# Patient Record
Sex: FEMALE | Race: WHITE | NOT HISPANIC OR LATINO | URBAN - METROPOLITAN AREA
[De-identification: names, ages, dates, MRNs, and addresses within clinical notes are randomized per-mention and may not be internally consistent; named-entity substitution may affect disease eponyms.]

---

## 2018-07-09 ENCOUNTER — TELEPHONE (OUTPATIENT)
Dept: OBSTETRICS AND GYNECOLOGY | Facility: CLINIC | Age: 63
End: 2018-07-09

## 2018-07-09 NOTE — TELEPHONE ENCOUNTER
Pt called and would like to talk to Dr Quintanilla about her visit with the rheumatologist and the side effects that she had from the medication. Wants to know if she should be taking both the Reqlast and the Evista. I called Dr Doyle's office for office notes and labs. The pt is going to send us her labs as well where she had an elevated CMP that was repeated 2 times and returned to WNL. Will present Dr Quintanilla all of the information and ask him to call her. Pt aware that he is not in the office today.

## 2018-07-11 ENCOUNTER — TELEPHONE (OUTPATIENT)
Dept: OBSTETRICS AND GYNECOLOGY | Facility: CLINIC | Age: 63
End: 2018-07-11

## 2018-07-11 NOTE — TELEPHONE ENCOUNTER
Called patient.  She was given a dose of Reclast by Dr. Doyle.  Following this, she had multiple systemic complaints.  These complaints resolved over approximately 4 weeks.  She questions whether to continue on Evista or not.  I suggested she recontact Dr. Doyle with this question.  If she is not reassured after his answer, she is to call here for a referral for second opinion.  She is comfortable with this plan.  She has no further questions at this time.    NAB

## 2018-10-23 ENCOUNTER — OFFICE VISIT (OUTPATIENT)
Dept: OBSTETRICS AND GYNECOLOGY | Facility: CLINIC | Age: 63
End: 2018-10-23
Payer: COMMERCIAL

## 2018-10-23 VITALS — SYSTOLIC BLOOD PRESSURE: 110 MMHG | DIASTOLIC BLOOD PRESSURE: 65 MMHG | WEIGHT: 142 LBS

## 2018-10-23 DIAGNOSIS — N64.4 MASTALGIA: Primary | ICD-10-CM

## 2018-10-23 PROCEDURE — 99213 OFFICE O/P EST LOW 20 MIN: CPT | Performed by: OBSTETRICS & GYNECOLOGY

## 2018-10-23 RX ORDER — COLCHICINE 0.6 MG/1
1.2 TABLET ORAL DAILY
COMMUNITY

## 2018-10-23 NOTE — PROGRESS NOTES
Patient ID: Luh Graham   : 1955 63 y.o.  MRN: [id number]   Visit Date: 10/23/2018    Subjective   Luh Graham is presenting today for Breast Cancer Screening (breast check)      Patient presents for a problem visit.  Approximately 4-5 days ago the patient noticed some discomfort in her left axillary region.  She believes she felt a palpable change in the area.  Since that time the pain has become less but is still present.  She also believes the palpable change is less but still present.  She had previously seen Dr. Ellis at [facility] for breast evaluation secondary to her mammogram and a family history of breast cancer.            Past Medical History:  has a past medical history of Breast cancer screening (2018); FMF (familial Mediterranean fever) (CMS/HCC) ([facility]); GERD (gastroesophageal reflux disease); H/O bone density study (2018); Osteoporosis; and Pap smear for cervical cancer screening (01/10/2017).     Past Surgical History:  has a past surgical history that includes Appendectomy;  section; Vulva surgery (); and Cervix lesion destruction ().    Obstetric History:   OB History    Para Term  AB Living   1 1 1     2   SAB TAB Ectopic Multiple Live Births         1 2      # Outcome Date GA Lbr Sudhir/2nd Weight Sex Delivery Anes PTL Lv   1A Term     F CS-LTranv   BENY   1B Term     F CS-LTranv   BENY          Medications:   Current Outpatient Prescriptions:   •  colchicine (COLCRYS) 0.6 mg tablet, Take by mouth., Disp: , Rfl:       Allergies: is allergic to penicillins.       Vital Signs for this encounter: /65   Wt 64.4 kg (142 lb)   Breastfeeding? No     Physical Exam   Constitutional: She appears well-developed and well-nourished. No distress.   Pulmonary/Chest:   Breasts are without dominant masses, skin changes, adenopathy or nipple discharge.  The patient identifies an area of concern in the left axilla at what may be the  end of the tail of breast tissue.       Impression/Plan:        Mastalgia    Family history of breast cancer    Reviewed patient's family history, her physical exam and her complaints.  Given that her symptoms seem to be resolving spontaneously, I have suggested that the patient wait for 2 weeks.  If her concerns of not resolved 100% at that time, she is to schedule a follow-up appointment Dr. Ellis for evaluation and treatment.  She is comfortable with this plan.  She will let me know if consultation is required.  If she does not contact the office it is our understanding that her concerns have completely resolved.    NAB

## 2018-12-12 ENCOUNTER — TELEPHONE (OUTPATIENT)
Dept: OBSTETRICS AND GYNECOLOGY | Facility: CLINIC | Age: 63
End: 2018-12-12

## 2018-12-12 NOTE — TELEPHONE ENCOUNTER
"Pt states that she had seen Dr. Quintanilla in October for an abnormality in her breast. It was recommended for her to see Dr. Ellis at [facility]. She did not make an appt to see him, but something is \"bothering her under her arm\". She is wondering if she should have her mammo early, or if she should have different imaging. I suggested she call to see how soon she can get in with Dr. Ellis. If it is not soon, I will ask Dr. Fournier if she would like her to come in or have special imaging. She agreed to this plan and will call back after she schedules with Dr. Ellis.  "

## 2019-02-04 DIAGNOSIS — Z12.39 SCREENING BREAST EXAMINATION: Primary | ICD-10-CM

## 2019-02-05 ENCOUNTER — OFFICE VISIT (OUTPATIENT)
Dept: OBSTETRICS AND GYNECOLOGY | Facility: CLINIC | Age: 64
End: 2019-02-05
Payer: COMMERCIAL

## 2019-02-05 VITALS
WEIGHT: 145.8 LBS | HEIGHT: 60 IN | DIASTOLIC BLOOD PRESSURE: 84 MMHG | SYSTOLIC BLOOD PRESSURE: 124 MMHG | BODY MASS INDEX: 28.62 KG/M2

## 2019-02-05 DIAGNOSIS — Z01.419 ENCOUNTER FOR GYNECOLOGICAL EXAMINATION WITHOUT ABNORMAL FINDING: Primary | ICD-10-CM

## 2019-02-05 PROCEDURE — S0612 ANNUAL GYNECOLOGICAL EXAMINA: HCPCS | Performed by: OBSTETRICS & GYNECOLOGY

## 2019-02-05 RX ORDER — LOSARTAN POTASSIUM 100 MG/1
100 TABLET ORAL DAILY
COMMUNITY
Start: 2019-01-15

## 2019-02-05 NOTE — PROGRESS NOTES
Patient ID: Luh Graham   : 1955 63 y.o.  MRN: [id number]   Visit Date: 2019    Subjective   Luh Graham is presenting today for Annual Exam      Patient presents for an annual GYN exam.  She has no complaints or concerns at this time.  She is undergoing new therapy for her familial Mediterranean fever.  She is seen by both nephrologist and rheumatologist.  Also, she is followed by Dr. Ellis at [facility] for breast issues.            Past Medical History:  has a past medical history of Breast cancer screening (2018); FMF (familial Mediterranean fever) (CMS/HCC) (); GERD (gastroesophageal reflux disease); H/O bone density study (2018); abnormal cervical Pap smear (); Hypertension; Osteoporosis; Osteoporosis; and Pap smear for cervical cancer screening (01/10/2017).     Past Surgical History:  has a past surgical history that includes Appendectomy;  section; Vulva surgery (); and Cervix lesion destruction ().    Obstetric History:   OB History    Para Term  AB Living   1 1 1     2   SAB TAB Ectopic Multiple Live Births         1 2      # Outcome Date GA Lbr Sudhir/2nd Weight Sex Delivery Anes PTL Lv   1A Term     F CS-LTranv   BENY   1B Term     F CS-LTranv   BENY          Family History: family history includes Breast cancer in her niece and sister; Cancer in her mother; Diabetes in her maternal grandmother and mother; Hypertension in her mother; Other cancer in her mother.    Social History:  reports that she has never smoked. She has never used smokeless tobacco. She reports that she drinks about 0.6 oz of alcohol per week . She reports that she does not use drugs.    Medications:   Current Outpatient Prescriptions:   •  anakinra (KINERET) 100 mg/0.67 mL injection, Inject 100 mg under the skin daily., Disp: , Rfl:   •  colchicine (COLCRYS) 0.6 mg tablet, Take by mouth., Disp: , Rfl:   •  losartan (COZAAR) 25 mg tablet, Take by  mouth once daily., Disp: , Rfl:       Allergies: is allergic to penicillins.     Review of Systems  Constitutional:   No hot flashes.   No night sweats.   No unexpected weight changes.  HENT:   No oral ulcers.   No headaches related to menstrual cycle.   Breasts:   No changes to skin of the breast or nipple.   No nipple discharge.   No breast lumps/cysts.   No breast pain.   Patient has not noticed any changes to breasts.   Respiratory:   No shortness of breath.  Cardiovascular:   No chest pain  Gastrointestinal:   No changes in bowel habits.  Genitourinary:   No pain with urination.   No urgency with urination.   No increased frequency of urination.   No urinary incontinence.   No vaginal discharge.   No painful intercourse.   No genital sores.   No irregular menstrual cycles.   No heavy menstrual cycles.   No painful menstrual periods.   No bleeding between menstrual cycles.   No bleeding after intercourse.  No absence of menstrual periods.  Integument:   No changes to any existing genital skin lesions or moles.   No new vaginal skin lesions or moles.   No genital rashes.   Endocrine:   No increased hair growth   Psychiatric:   No anxiety.   No depression.   No suicidal ideation.   Heme-Lymph:   No easy bruising.   No unexplained lumps.          Vital Signs for this encounter: /84 (BP Location: Left upper arm, Patient Position: Sitting)   Ht 1.524 m (5')   Wt 66.1 kg (145 lb 12.8 oz)   BMI 28.47 kg/m²     OBGyn Exam    General Appearance: Alert, cooperative, no acute distress  Head: Normocephalic, without obvious abnormality  Breast: No tenderness, masses, skin changes, adenopathy, or nipple discharge.  Abdomen: Soft, nontender, nondistended, no masses, no organomegaly  Genitalia:   External genitalia: Moderate atrophic changes without rashes or lesions.    Vagina: Moderate atrophic changes, narrow introitus, no blood or discharge seen.    Cervix: Deep, no lesions seen.    Uterus: Mid position, normal  size.    Adnexa: No palpable masses or tenderness.      Extremities: no edema    Impression/Plan:    Normal GYN exam    Mammogram ordered.    Return 1 year or sooner as needed.    NAB

## 2019-06-11 ENCOUNTER — TELEPHONE (OUTPATIENT)
Dept: OBSTETRICS AND GYNECOLOGY | Facility: CLINIC | Age: 64
End: 2019-06-11

## 2019-07-31 DIAGNOSIS — Z12.39 SCREENING BREAST EXAMINATION: ICD-10-CM

## 2019-07-31 PROCEDURE — 200200 BI SCREENING MAMMOGRAM BILATERAL: Mod: 26 | Performed by: OBSTETRICS & GYNECOLOGY

## 2020-02-06 ENCOUNTER — OFFICE VISIT (OUTPATIENT)
Dept: OBSTETRICS AND GYNECOLOGY | Facility: CLINIC | Age: 65
End: 2020-02-06
Payer: COMMERCIAL

## 2020-02-06 VITALS — BODY MASS INDEX: 28.66 KG/M2 | HEIGHT: 60 IN | WEIGHT: 146 LBS

## 2020-02-06 DIAGNOSIS — Z12.31 BREAST CANCER SCREENING BY MAMMOGRAM: ICD-10-CM

## 2020-02-06 DIAGNOSIS — Z01.419 ENCOUNTER FOR GYNECOLOGICAL EXAMINATION WITHOUT ABNORMAL FINDING: Primary | ICD-10-CM

## 2020-02-06 DIAGNOSIS — Z12.4 PAP SMEAR FOR CERVICAL CANCER SCREENING: ICD-10-CM

## 2020-02-06 PROCEDURE — S0612 ANNUAL GYNECOLOGICAL EXAMINA: HCPCS | Performed by: OBSTETRICS & GYNECOLOGY

## 2020-02-06 NOTE — PROGRESS NOTES
Patient ID: Luh Graham   : 1955 64 y.o.  MRN: [id number]   Visit Date: 2020    Subjective   Luh Graham is presenting today for Annual Exam      Patient presents for an annual GYN exam.  She has no complaints at this time.  She is followed by Dr. Ellis at [facility] for her breast health.  She is due for mammogram at this time.  Patient has been diagnosed with osteoporosis and is soon to start therapy.          Past Medical History:  has a past medical history of Breast cancer screening (2019), FMF (familial Mediterranean fever) (CMS/[facility]), GERD (gastroesophageal reflux disease), H/O bone density study (2019), abnormal cervical Pap smear (), Hypertension, Osteoporosis, and Pap smear for cervical cancer screening (01/10/2017).     Past Surgical History:  has a past surgical history that includes Appendectomy;  section; Vulva surgery (); Cervix lesion destruction (); and Colonoscopy ().    Obstetric History:   OB History    Para Term  AB Living   1 1 1     2   SAB TAB Ectopic Multiple Live Births         1 2      # Outcome Date GA Lbr Sudhir/2nd Weight Sex Delivery Anes PTL Lv   1A Term     F CS-LTranv   BENY   1B Term     F CS-LTranv   BENY       Family History: family history includes Breast cancer in her biological sister and niece; Cancer in her biological mother; Diabetes in her biological mother and maternal grandmother; Hypertension in her biological mother; Other cancer in her biological mother.    Social History:  reports that she has never smoked. She has never used smokeless tobacco. She reports that she drinks alcohol. She reports that she does not use drugs.    Medications:   Current Outpatient Medications:   •  canakinumab, PF, (ILARIS, PF,) 150 mg/mL solution, Inject 150 mg under the skin every 28 (twentyeight) days., Disp: , Rfl:   •  colchicine (COLCRYS) 0.6 mg tablet, Take by mouth., Disp: , Rfl:   •  losartan  (COZAAR) 25 mg tablet, Take 50 mg by mouth once daily.  , Disp: , Rfl:       Allergies: is allergic to penicillins.     Review of Systems  Constitutional:   No hot flashes.   No night sweats.   No unexpected weight changes.  HENT:   No oral ulcers.   No headaches related to menstrual cycle.   Breasts:   No changes to skin of the breast or nipple.   No nipple discharge.   No breast lumps/cysts.   No breast pain.   Patient has not noticed any changes to breasts.   Respiratory:   No shortness of breath.  Cardiovascular:   No chest pain  Gastrointestinal:   No changes in bowel habits.  Genitourinary:   No pain with urination.   No urgency with urination.   No increased frequency of urination.   No urinary incontinence.   No vaginal dryness.  No vaginal discharge.   No painful intercourse.   No genital sores.   No irregular menstrual cycles.   No heavy menstrual cycles.   No painful menstrual periods.   No bleeding between menstrual cycles.   No bleeding after intercourse.  No absence of menstrual periods.  Integument:   No changes to any existing genital skin lesions or moles.   No new vaginal skin lesions or moles.   No genital rashes.   Endocrine:   No increased hair growth   Psychiatric:   No anxiety.   No depression.   No suicidal ideation.  Patient does not have concerns for her safety.   Heme-Lymph:   No easy bruising.   No unexplained lumps.           Vital Signs for this encounter:   Visit Vitals  Ht 1.524 m (5')   Wt 66.2 kg (146 lb)   BMI 28.51 kg/m²       OBGyn Exam    General Appearance: Alert, cooperative, no acute distress  Head: Normocephalic, without obvious abnormality  Breast: No tenderness, masses, skin changes, adenopathy, or nipple discharge.  Abdomen: Soft, nontender, nondistended, no masses, no organomegaly  Genitalia:   External genitalia: Moderate atrophic changes without rashes or lesions seen.    Vagina: Moderate atrophic changes, admits one finger, no blood or discharge seen.    Cervix: Deep,  posterior, no lesions seen.    Uterus: Mid position, normal size.    Adnexa: No palpable masses or tenderness.      Extremities: no edema    Impression/Plan:    Normal GYN exam    Pap with HPV testing sent.  Patient to call the office or check the portal in 10 days for results.    Mammogram ordered.    Return 1 year or sooner as needed.    NAB

## 2020-02-12 LAB
CYTOLOGIST CVX/VAG CYTO: NORMAL
CYTOLOGY CVX/VAG DOC CYTO: NORMAL
CYTOLOGY CVX/VAG DOC THIN PREP: NORMAL
DX ICD CODE: NORMAL
HPV I/H RISK 4 DNA CVX QL PROBE+SIG AMP: NEGATIVE
LAB CORP NOTE:: NORMAL
LAB CORP QC REVIEWED BY:: NORMAL
OTHER STN SPEC: NORMAL
STAT OF ADQ CVX/VAG CYTO-IMP: NORMAL

## 2020-02-18 DIAGNOSIS — Z12.31 BREAST CANCER SCREENING BY MAMMOGRAM: ICD-10-CM

## 2020-07-19 ENCOUNTER — APPOINTMENT (EMERGENCY)
Dept: RADIOLOGY | Facility: HOSPITAL | Age: 65
DRG: 552 | End: 2020-07-19
Attending: EMERGENCY MEDICINE
Payer: COMMERCIAL

## 2020-07-19 ENCOUNTER — HOSPITAL ENCOUNTER (INPATIENT)
Facility: HOSPITAL | Age: 65
LOS: 1 days | Discharge: HOME HEALTH | DRG: 552 | End: 2020-07-20
Attending: EMERGENCY MEDICINE | Admitting: SURGERY
Payer: COMMERCIAL

## 2020-07-19 DIAGNOSIS — D25.9 UTERINE LEIOMYOMA, UNSPECIFIED LOCATION: ICD-10-CM

## 2020-07-19 DIAGNOSIS — S32.010A CLOSED COMPRESSION FRACTURE OF BODY OF L1 VERTEBRA (CMS/HCC): ICD-10-CM

## 2020-07-19 DIAGNOSIS — M25.551 PAIN OF BOTH HIP JOINTS: ICD-10-CM

## 2020-07-19 DIAGNOSIS — M54.50 ACUTE MIDLINE LOW BACK PAIN WITHOUT SCIATICA: ICD-10-CM
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
## 2020-07-19 DIAGNOSIS — M25.552 PAIN OF BOTH HIP JOINTS: ICD-10-CM

## 2020-07-19 LAB
ABO + RH BLD: NORMAL
ALBUMIN SERPL-MCNC: 4.3 G/DL (ref 3.4–5)
ALP SERPL-CCNC: 57 IU/L (ref 35–126)
ALT SERPL-CCNC: 25 IU/L (ref 11–54)
AMPHET UR QL SCN: NOT DETECTED
ANION GAP SERPL CALC-SCNC: 9 MEQ/L (ref 3–15)
APTT PPP: 26 SEC (ref 23–35)
AST SERPL-CCNC: 38 IU/L (ref 15–41)
BARBITURATES UR QL SCN: NOT DETECTED
BASOPHILS # BLD: 0.02 K/UL (ref 0.01–0.1)
BASOPHILS NFR BLD: 0.6 %
BENZODIAZ UR QL SCN: NOT DETECTED
BILIRUB DIRECT SERPL-MCNC: 0.1 MG/DL
BILIRUB SERPL-MCNC: 0.9 MG/DL (ref 0.3–1.2)
BILIRUB UR QL STRIP.AUTO: NEGATIVE MG/DL
BLD GP AB SCN SERPL QL: NEGATIVE
BUN SERPL-MCNC: 16 MG/DL (ref 8–20)
CALCIUM SERPL-MCNC: 8.6 MG/DL (ref 8.9–10.3)
CANNABINOIDS UR QL SCN: NOT DETECTED
CHLORIDE SERPL-SCNC: 108 MEQ/L (ref 98–109)
CLARITY UR REFRACT.AUTO: CLEAR
CO2 SERPL-SCNC: 21 MEQ/L (ref 22–32)
COCAINE UR QL SCN: NOT DETECTED
COLOR UR AUTO: YELLOW
CREAT SERPL-MCNC: 0.8 MG/DL (ref 0.6–1.1)
D AG BLD QL: NEGATIVE
DIFFERENTIAL METHOD BLD: ABNORMAL
EOSINOPHIL # BLD: 0.06 K/UL (ref 0.04–0.36)
EOSINOPHIL NFR BLD: 1.7 %
ERYTHROCYTE [DISTWIDTH] IN BLOOD BY AUTOMATED COUNT: 12.4 % (ref 11.7–14.4)
ETHANOL SERPL-MCNC: <5 MG/DL
GFR SERPL CREATININE-BSD FRML MDRD: >60 ML/MIN/1.73M*2
GLUCOSE SERPL-MCNC: 95 MG/DL (ref 70–99)
GLUCOSE UR STRIP.AUTO-MCNC: NEGATIVE MG/DL
HCT VFR BLDCO AUTO: 39.4 % (ref 35–45)
HGB BLD-MCNC: 13 G/DL (ref 11.8–15.7)
HGB UR QL STRIP.AUTO: NEGATIVE
IMM GRANULOCYTES # BLD AUTO: 0.05 K/UL (ref 0–0.08)
IMM GRANULOCYTES NFR BLD AUTO: 1.4 %
INR PPP: 1.1 INR
KETONES UR STRIP.AUTO-MCNC: NEGATIVE MG/DL
LABORATORY COMMENT REPORT: NORMAL
LEUKOCYTE ESTERASE UR QL STRIP.AUTO: NEGATIVE
LYMPHOCYTES # BLD: 1.89 K/UL (ref 1.2–3.5)
LYMPHOCYTES NFR BLD: 52.1 %
MAGNESIUM SERPL-MCNC: 1 MG/DL (ref 1.8–2.5)
MCH RBC QN AUTO: 30.4 PG (ref 28–33.2)
MCHC RBC AUTO-ENTMCNC: 33 G/DL (ref 32.2–35.5)
MCV RBC AUTO: 92.1 FL (ref 83–98)
MONOCYTES # BLD: 0.24 K/UL (ref 0.28–0.8)
MONOCYTES NFR BLD: 6.6 %
NEUTROPHILS # BLD: 1.37 K/UL (ref 1.7–7)
NEUTS SEG NFR BLD: 37.6 %
NITRITE UR QL STRIP.AUTO: NEGATIVE
NRBC BLD-RTO: 0 %
OPIATES UR QL SCN: NOT DETECTED
PCP UR QL SCN: NOT DETECTED
PDW BLD AUTO: 10.6 FL (ref 9.4–12.3)
PH UR STRIP.AUTO: 6.5 [PH]
PLAT MORPH BLD: NORMAL
PLATELET # BLD AUTO: 181 K/UL (ref 150–369)
PLATELET # BLD EST: ABNORMAL 10*3/UL
POTASSIUM SERPL-SCNC: 3.6 MEQ/L (ref 3.6–5.1)
PROT SERPL-MCNC: 7.7 G/DL (ref 6–8.2)
PROT UR QL STRIP.AUTO: NEGATIVE
PROTHROMBIN TIME: 13.7 SEC (ref 12.2–14.5)
RBC # BLD AUTO: 4.28 M/UL (ref 3.93–5.22)
RBC MORPH BLD: NORMAL
SARS-COV-2 RNA RESP QL NAA+PROBE: NEGATIVE
SODIUM SERPL-SCNC: 138 MEQ/L (ref 136–144)
SP GR UR REFRACT.AUTO: 1.03
UROBILINOGEN UR STRIP-ACNC: 0.2 EU/DL
WBC # BLD AUTO: 3.63 K/UL (ref 3.8–10.5)

## 2020-07-19 PROCEDURE — 83735 ASSAY OF MAGNESIUM: CPT | Performed by: PHYSICIAN ASSISTANT

## 2020-07-19 PROCEDURE — 80053 COMPREHEN METABOLIC PANEL: CPT | Performed by: PHYSICIAN ASSISTANT

## 2020-07-19 PROCEDURE — 74177 CT ABD & PELVIS W/CONTRAST: CPT

## 2020-07-19 PROCEDURE — 63600000 HC DRUGS/DETAIL CODE: Performed by: PHYSICIAN ASSISTANT

## 2020-07-19 PROCEDURE — 71045 X-RAY EXAM CHEST 1 VIEW: CPT

## 2020-07-19 PROCEDURE — 80307 DRUG TEST PRSMV CHEM ANLYZR: CPT | Performed by: NURSE PRACTITIONER

## 2020-07-19 PROCEDURE — 96361 HYDRATE IV INFUSION ADD-ON: CPT | Mod: 59

## 2020-07-19 PROCEDURE — 82248 BILIRUBIN DIRECT: CPT | Performed by: PHYSICIAN ASSISTANT

## 2020-07-19 PROCEDURE — 25800000 HC PHARMACY IV SOLUTIONS: Performed by: NURSE PRACTITIONER

## 2020-07-19 PROCEDURE — 63700000 HC SELF-ADMINISTRABLE DRUG: Performed by: NURSE PRACTITIONER

## 2020-07-19 PROCEDURE — 93010 ELECTROCARDIOGRAM REPORT: CPT | Performed by: INTERNAL MEDICINE

## 2020-07-19 PROCEDURE — 96374 THER/PROPH/DIAG INJ IV PUSH: CPT | Mod: 59

## 2020-07-19 PROCEDURE — 99285 EMERGENCY DEPT VISIT HI MDM: CPT | Mod: 25

## 2020-07-19 PROCEDURE — 85025 COMPLETE CBC W/AUTO DIFF WBC: CPT | Performed by: PHYSICIAN ASSISTANT

## 2020-07-19 PROCEDURE — 86850 RBC ANTIBODY SCREEN: CPT

## 2020-07-19 PROCEDURE — G0480 DRUG TEST DEF 1-7 CLASSES: HCPCS | Performed by: NURSE PRACTITIONER

## 2020-07-19 PROCEDURE — 93005 ELECTROCARDIOGRAM TRACING: CPT | Performed by: PHYSICIAN ASSISTANT

## 2020-07-19 PROCEDURE — 85610 PROTHROMBIN TIME: CPT | Performed by: PHYSICIAN ASSISTANT

## 2020-07-19 PROCEDURE — 63600105 HC IODINE BASED CONTRAST: Performed by: PHYSICIAN ASSISTANT

## 2020-07-19 PROCEDURE — 81003 URINALYSIS AUTO W/O SCOPE: CPT | Performed by: NURSE PRACTITIONER

## 2020-07-19 PROCEDURE — U0002 COVID-19 LAB TEST NON-CDC: HCPCS | Performed by: PHYSICIAN ASSISTANT

## 2020-07-19 PROCEDURE — 72148 MRI LUMBAR SPINE W/O DYE: CPT

## 2020-07-19 PROCEDURE — 36415 COLL VENOUS BLD VENIPUNCTURE: CPT | Performed by: PHYSICIAN ASSISTANT

## 2020-07-19 PROCEDURE — 21400000 HC ROOM AND CARE CCU/INTERMEDIATE

## 2020-07-19 PROCEDURE — 85730 THROMBOPLASTIN TIME PARTIAL: CPT | Performed by: PHYSICIAN ASSISTANT

## 2020-07-19 PROCEDURE — 96375 TX/PRO/DX INJ NEW DRUG ADDON: CPT | Mod: 59

## 2020-07-19 RX ORDER — HYDROMORPHONE HYDROCHLORIDE 1 MG/ML
1 INJECTION, SOLUTION INTRAMUSCULAR; INTRAVENOUS; SUBCUTANEOUS ONCE
Status: COMPLETED | OUTPATIENT
Start: 2020-07-19 | End: 2020-07-19

## 2020-07-19 RX ORDER — LOSARTAN POTASSIUM 50 MG/1
50 TABLET ORAL DAILY
Status: DISCONTINUED | OUTPATIENT
Start: 2020-07-20 | End: 2020-07-20

## 2020-07-19 RX ORDER — COLCHICINE 0.6 MG/1
0.6 TABLET ORAL DAILY
Status: DISCONTINUED | OUTPATIENT
Start: 2020-07-20 | End: 2020-07-20

## 2020-07-19 RX ORDER — LORAZEPAM 2 MG/ML
1 INJECTION INTRAMUSCULAR ONCE
Status: COMPLETED | OUTPATIENT
Start: 2020-07-19 | End: 2020-07-19

## 2020-07-19 RX ORDER — OXYCODONE HYDROCHLORIDE 5 MG/1
5 TABLET ORAL EVERY 4 HOURS PRN
Status: DISCONTINUED | OUTPATIENT
Start: 2020-07-19 | End: 2020-07-20 | Stop reason: HOSPADM

## 2020-07-19 RX ORDER — DEXTROSE 50 % IN WATER (D50W) INTRAVENOUS SYRINGE
25 AS NEEDED
Status: DISCONTINUED | OUTPATIENT
Start: 2020-07-19 | End: 2020-07-20 | Stop reason: HOSPADM

## 2020-07-19 RX ORDER — ACETAMINOPHEN 325 MG/1
975 TABLET ORAL EVERY 6 HOURS
Status: DISCONTINUED | OUTPATIENT
Start: 2020-07-19 | End: 2020-07-20 | Stop reason: HOSPADM

## 2020-07-19 RX ORDER — AMOXICILLIN 250 MG
1 CAPSULE ORAL 2 TIMES DAILY
Status: DISCONTINUED | OUTPATIENT
Start: 2020-07-19 | End: 2020-07-20 | Stop reason: HOSPADM

## 2020-07-19 RX ORDER — HEPARIN SODIUM 5000 [USP'U]/ML
5000 INJECTION, SOLUTION INTRAVENOUS; SUBCUTANEOUS EVERY 8 HOURS
Status: DISCONTINUED | OUTPATIENT
Start: 2020-07-19 | End: 2020-07-20 | Stop reason: HOSPADM

## 2020-07-19 RX ORDER — OXYCODONE HYDROCHLORIDE 5 MG/1
10 TABLET ORAL EVERY 4 HOURS PRN
Status: DISCONTINUED | OUTPATIENT
Start: 2020-07-19 | End: 2020-07-20 | Stop reason: HOSPADM

## 2020-07-19 RX ORDER — POLYETHYLENE GLYCOL 3350 17 G/17G
17 POWDER, FOR SOLUTION ORAL DAILY
Status: DISCONTINUED | OUTPATIENT
Start: 2020-07-20 | End: 2020-07-20 | Stop reason: HOSPADM

## 2020-07-19 RX ADMIN — ACETAMINOPHEN 975 MG: 325 TABLET, FILM COATED ORAL at 23:36

## 2020-07-19 RX ADMIN — SODIUM CHLORIDE 1000 ML: 9 INJECTION, SOLUTION INTRAVENOUS at 17:40

## 2020-07-19 RX ADMIN — IOHEXOL 100 ML: 350 INJECTION, SOLUTION INTRAVENOUS at 13:10

## 2020-07-19 RX ADMIN — LORAZEPAM 1 MG: 2 INJECTION INTRAMUSCULAR; INTRAVENOUS at 15:38

## 2020-07-19 RX ADMIN — SODIUM CHLORIDE 1000 ML: 9 INJECTION, SOLUTION INTRAVENOUS at 19:05

## 2020-07-19 RX ADMIN — HYDROMORPHONE HYDROCHLORIDE 1 MG: 1 INJECTION, SOLUTION INTRAMUSCULAR; INTRAVENOUS; SUBCUTANEOUS at 12:51

## 2020-07-19 RX ADMIN — ACETAMINOPHEN 975 MG: 325 TABLET, FILM COATED ORAL at 18:01

## 2020-07-19 ASSESSMENT — ENCOUNTER SYMPTOMS
LIGHT-HEADEDNESS: 0
COLOR CHANGE: 0
VOMITING: 0
NAUSEA: 0
NUMBNESS: 0
ABDOMINAL PAIN: 0
HEADACHES: 0
NECK PAIN: 0
SPEECH DIFFICULTY: 0
SHORTNESS OF BREATH: 0
DIARRHEA: 0
FATIGUE: 0
AGITATION: 0
DIFFICULTY URINATING: 0
FACIAL SWELLING: 0
ARTHRALGIAS: 1
COUGH: 0
PHOTOPHOBIA: 0
WOUND: 0
WEAKNESS: 0
BRUISES/BLEEDS EASILY: 0
FACIAL ASYMMETRY: 0
BACK PAIN: 1
DIZZINESS: 0

## 2020-07-19 ASSESSMENT — COGNITIVE AND FUNCTIONAL STATUS - GENERAL
STANDING UP FROM CHAIR USING ARMS: 2 - A LOT
TOILETING: 3 - A LITTLE
CLIMB 3 TO 5 STEPS WITH RAILING: 2 - A LOT
HELP NEEDED FOR BATHING: 3 - A LITTLE
MOVING TO AND FROM BED TO CHAIR: 2 - A LOT
HELP NEEDED FOR PERSONAL GROOMING: 3 - A LITTLE
EATING MEALS: 4 - NONE
DRESSING REGULAR UPPER BODY CLOTHING: 3 - A LITTLE
DRESSING REGULAR LOWER BODY CLOTHING: 3 - A LITTLE
WALKING IN HOSPITAL ROOM: 2 - A LOT
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
## 2020-07-19 NOTE — PROGRESS NOTES
CONSULTS     Time Notified:  1537 Time Patient Seen: 1550     Consultant Emergent  Urgent or   Routine Time Paged Time Responded   [facility] routine  1526 by ED               *emergent requires <30 minutes response on site; urgent requires <12 hours response on site.       Per [facility], non-op mgmt; LSO brace.
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
## 2020-07-20 ENCOUNTER — APPOINTMENT (INPATIENT)
Dept: RADIOLOGY | Facility: HOSPITAL | Age: 65
DRG: 552 | End: 2020-07-20
Attending: PHYSICIAN ASSISTANT
Payer: COMMERCIAL

## 2020-07-20 VITALS
OXYGEN SATURATION: 98 % | WEIGHT: 146.16 LBS | DIASTOLIC BLOOD PRESSURE: 85 MMHG | BODY MASS INDEX: 29.47 KG/M2 | TEMPERATURE: 97.4 F | HEIGHT: 59 IN | HEART RATE: 71 BPM | SYSTOLIC BLOOD PRESSURE: 158 MMHG | RESPIRATION RATE: 18 BRPM

## 2020-07-20 LAB
ANION GAP SERPL CALC-SCNC: 9 MEQ/L (ref 3–15)
BUN SERPL-MCNC: 14 MG/DL (ref 8–20)
CALCIUM SERPL-MCNC: 8 MG/DL (ref 8.9–10.3)
CHLORIDE SERPL-SCNC: 108 MEQ/L (ref 98–109)
CO2 SERPL-SCNC: 22 MEQ/L (ref 22–32)
CREAT SERPL-MCNC: <0.3 MG/DL (ref 0.6–1.1)
ERYTHROCYTE [DISTWIDTH] IN BLOOD BY AUTOMATED COUNT: 12.5 % (ref 11.7–14.4)
GFR SERPL CREATININE-BSD FRML MDRD: ABNORMAL ML/MIN/{1.73_M2}
GLUCOSE SERPL-MCNC: 91 MG/DL (ref 70–99)
HCT VFR BLDCO AUTO: 38 % (ref 35–45)
HGB BLD-MCNC: 12.6 G/DL (ref 11.8–15.7)
MAGNESIUM SERPL-MCNC: 3.2 MG/DL (ref 1.8–2.5)
MCH RBC QN AUTO: 30.5 PG (ref 28–33.2)
MCHC RBC AUTO-ENTMCNC: 33.2 G/DL (ref 32.2–35.5)
MCV RBC AUTO: 92 FL (ref 83–98)
PDW BLD AUTO: 10.4 FL (ref 9.4–12.3)
PHOSPHATE SERPL-MCNC: 2.2 MG/DL (ref 2.4–4.7)
PLATELET # BLD AUTO: 166 K/UL (ref 150–369)
POTASSIUM SERPL-SCNC: 3.1 MEQ/L (ref 3.6–5.1)
RBC # BLD AUTO: 4.13 M/UL (ref 3.93–5.22)
SODIUM SERPL-SCNC: 139 MEQ/L (ref 136–144)
WBC # BLD AUTO: 4.99 K/UL (ref 3.8–10.5)

## 2020-07-20 PROCEDURE — 97162 PT EVAL MOD COMPLEX 30 MIN: CPT | Mod: GP

## 2020-07-20 PROCEDURE — 25800000 HC PHARMACY IV SOLUTIONS: Performed by: PHYSICIAN ASSISTANT

## 2020-07-20 PROCEDURE — 80048 BASIC METABOLIC PNL TOTAL CA: CPT | Performed by: NURSE PRACTITIONER

## 2020-07-20 PROCEDURE — 85027 COMPLETE CBC AUTOMATED: CPT | Performed by: NURSE PRACTITIONER

## 2020-07-20 PROCEDURE — 83735 ASSAY OF MAGNESIUM: CPT | Performed by: NURSE PRACTITIONER

## 2020-07-20 PROCEDURE — 97530 THERAPEUTIC ACTIVITIES: CPT | Mod: GO

## 2020-07-20 PROCEDURE — 63600000 HC DRUGS/DETAIL CODE: Performed by: PHYSICIAN ASSISTANT

## 2020-07-20 PROCEDURE — 97166 OT EVAL MOD COMPLEX 45 MIN: CPT | Mod: GO

## 2020-07-20 PROCEDURE — 63600000 HC DRUGS/DETAIL CODE: Performed by: NURSE PRACTITIONER

## 2020-07-20 PROCEDURE — 63700000 HC SELF-ADMINISTRABLE DRUG: Performed by: NURSE PRACTITIONER

## 2020-07-20 PROCEDURE — 63700000 HC SELF-ADMINISTRABLE DRUG: Performed by: PHYSICIAN ASSISTANT

## 2020-07-20 PROCEDURE — 36415 COLL VENOUS BLD VENIPUNCTURE: CPT | Performed by: NURSE PRACTITIONER

## 2020-07-20 PROCEDURE — 84100 ASSAY OF PHOSPHORUS: CPT | Performed by: NURSE PRACTITIONER

## 2020-07-20 PROCEDURE — 73020 X-RAY EXAM OF SHOULDER: CPT | Mod: LT

## 2020-07-20 PROCEDURE — 99221 1ST HOSP IP/OBS SF/LOW 40: CPT | Performed by: NEUROLOGICAL SURGERY

## 2020-07-20 RX ORDER — ACETAMINOPHEN 325 MG/1
975 TABLET ORAL EVERY 6 HOURS PRN
COMMUNITY
Start: 2020-07-20 | End: 2020-08-03

## 2020-07-20 RX ORDER — LIDOCAINE 560 MG/1
1 PATCH PERCUTANEOUS; TOPICAL; TRANSDERMAL DAILY
Status: DISCONTINUED | OUTPATIENT
Start: 2020-07-20 | End: 2020-07-20 | Stop reason: HOSPADM

## 2020-07-20 RX ORDER — LOSARTAN POTASSIUM 50 MG/1
50 TABLET ORAL ONCE
Status: COMPLETED | OUTPATIENT
Start: 2020-07-20 | End: 2020-07-20

## 2020-07-20 RX ORDER — COLCHICINE 0.6 MG/1
1.2 TABLET ORAL DAILY
Status: DISCONTINUED | OUTPATIENT
Start: 2020-07-21 | End: 2020-07-20 | Stop reason: HOSPADM

## 2020-07-20 RX ORDER — COLCHICINE 0.6 MG/1
0.6 TABLET ORAL ONCE
Status: COMPLETED | OUTPATIENT
Start: 2020-07-20 | End: 2020-07-20

## 2020-07-20 RX ORDER — AMOXICILLIN 250 MG
1 CAPSULE ORAL 2 TIMES DAILY
Qty: 28 TABLET | Refills: 0 | COMMUNITY
Start: 2020-07-20 | End: 2021-02-23 | Stop reason: ALTCHOICE

## 2020-07-20 RX ORDER — LOSARTAN POTASSIUM 100 MG/1
100 TABLET ORAL DAILY
Status: DISCONTINUED | OUTPATIENT
Start: 2020-07-21 | End: 2020-07-20 | Stop reason: HOSPADM

## 2020-07-20 RX ORDER — LIDOCAINE 560 MG/1
1 PATCH PERCUTANEOUS; TOPICAL; TRANSDERMAL DAILY PRN
COMMUNITY
Start: 2020-07-20 | End: 2021-02-23 | Stop reason: ALTCHOICE

## 2020-07-20 RX ORDER — POTASSIUM CHLORIDE 750 MG/1
40 TABLET, FILM COATED, EXTENDED RELEASE ORAL ONCE
Status: COMPLETED | OUTPATIENT
Start: 2020-07-20 | End: 2020-07-20

## 2020-07-20 RX ADMIN — LOSARTAN POTASSIUM 50 MG: 50 TABLET, FILM COATED ORAL at 18:18

## 2020-07-20 RX ADMIN — COLCHICINE 0.6 MG: 0.6 TABLET, FILM COATED ORAL at 08:55

## 2020-07-20 RX ADMIN — SODIUM CHLORIDE 40 MEQ: 9 INJECTION, SOLUTION INTRAVENOUS at 14:56

## 2020-07-20 RX ADMIN — POTASSIUM CHLORIDE 40 MEQ: 750 TABLET, FILM COATED, EXTENDED RELEASE ORAL at 15:35

## 2020-07-20 RX ADMIN — LOSARTAN POTASSIUM 50 MG: 50 TABLET, FILM COATED ORAL at 09:09

## 2020-07-20 RX ADMIN — COLCHICINE 0.6 MG: 0.6 TABLET, FILM COATED ORAL at 18:18

## 2020-07-20 RX ADMIN — HEPARIN SODIUM 5000 UNITS: 5000 INJECTION, SOLUTION INTRAVENOUS; SUBCUTANEOUS at 14:53

## 2020-07-20 RX ADMIN — ACETAMINOPHEN 975 MG: 325 TABLET, FILM COATED ORAL at 11:29

## 2020-07-20 RX ADMIN — LIDOCAINE 1 PATCH: 246 PATCH TOPICAL at 18:25

## 2020-07-20 RX ADMIN — ACETAMINOPHEN 975 MG: 325 TABLET, FILM COATED ORAL at 18:17

## 2020-07-20 RX ADMIN — MAGNESIUM SULFATE IN WATER 2 G: 40 INJECTION, SOLUTION INTRAVENOUS at 01:58

## 2020-07-20 ASSESSMENT — COGNITIVE AND FUNCTIONAL STATUS - GENERAL
HELP NEEDED FOR BATHING: 3 - A LITTLE
CLIMB 3 TO 5 STEPS WITH RAILING: 3 - A LITTLE
TOILETING: 4 - NONE
WALKING IN HOSPITAL ROOM: 3 - A LITTLE
STANDING UP FROM CHAIR USING ARMS: 3 - A LITTLE
HELP NEEDED FOR PERSONAL GROOMING: 4 - NONE
DRESSING REGULAR UPPER BODY CLOTHING: 3 - A LITTLE
EATING MEALS: 4 - NONE
AFFECT: WFL
MOVING TO AND FROM BED TO CHAIR: 3 - A LITTLE
DRESSING REGULAR LOWER BODY CLOTHING: 3 - A LITTLE
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
## 2020-07-20 NOTE — PLAN OF CARE
Problem: Adult Inpatient Plan of Care  Goal: Plan of Care Review  Outcome: Progressing  Flowsheets (Taken 7/20/2020 8304)  Progress: improving  Plan of Care Reviewed With: patient  Outcome Summary: PT eval completed: pt is limited 2* pain, however dem amelioration of symptoms with LSO brace. Pt dem min A-sup w/func mob.  D/w pt spinal prec.  Issued jr RW.  Goal: Patient-Specific Goal (Individualization)  Outcome: Progressing
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
## 2020-07-20 NOTE — NURSING NOTE
IV ML discontinued. Discharge instructions reviewed with patient. Patient expressed understanding. Patient discharged home. Tiffany Du RN

## 2020-07-20 NOTE — PLAN OF CARE
Problem: Adult Inpatient Plan of Care  Goal: Plan of Care Review  Flowsheets (Taken 7/20/2020 7156)  Progress: improving  Plan of Care Reviewed With: patient  Outcome Summary: Patient demonstrates a decline in independence with ADLs and functional transfers 2/2 weakness, decreased activity tolerance, and pain. Min A all bed mobility, Close sup for functional transfers. Patient also supervision level for ADLs after education on adapted technique. Demonstrates good understanding of spinal precautions. Will continue to follow in order to maximize independence with ADLs and functional transfers.

## 2020-07-20 NOTE — PROGRESS NOTES
Patient: Luh Graham  Location: [facility] Surgical Step Down 0361  MRN: [id number]  Today's date: 7/20/2020    Attempted to see patient for therapy. Unable due to medical hold.   Awaiting back brace: will cont to follow.
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
## 2020-07-20 NOTE — PROGRESS NOTES
Spoke with patient and confirmed with medication list from [facility] and/or patient's own pharmacy to complete the medication reconciliation.     Prior to admission medication list:  •  canakinumab (PF), Inject 150 mg under the skin every 28 (twentyeight) days.  •  colchicine, Take 1.2 mg by mouth daily.    •  losartan, Take 100 mg by mouth once daily.      Comments about home medications:  -Patient states her last Ilaris injection was 7/16/2020    Compliance:   -Per pharmacy, patient was filling losartan 50 mg. Patient has not yet filled 100 mg tablet but they do have script on file. Patient states she is taking 100 mg.    -Unable to verify any fills for colchicine but patient states she takes 1.2 mg daily.
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
## 2020-07-20 NOTE — PLAN OF CARE
Problem: Adult Inpatient Plan of Care  Goal: Plan of Care Review  Outcome: Progressing  Flowsheets (Taken 7/20/2020 0400)  Progress: improving  Plan of Care Reviewed With: patient  Goal: Patient-Specific Goal (Individualization)  Outcome: Progressing  Goal: Absence of Hospital-Acquired Illness or Injury  Outcome: Progressing  Goal: Optimal Comfort and Wellbeing  Outcome: Progressing  Goal: Readiness for Transition of Care  Outcome: Progressing     Problem: Skin Injury Risk Increased  Goal: Skin Health and Integrity  Outcome: Progressing     Problem: Pain Acute  Goal: Optimal Pain Control  Outcome: Progressing     Problem: Hypertension Comorbidity  Goal: Blood Pressure in Desired Range  Outcome: Progressing
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
## 2020-07-21 ENCOUNTER — TELEPHONE (OUTPATIENT)
Dept: OBSTETRICS AND GYNECOLOGY | Facility: CLINIC | Age: 65
End: 2020-07-21

## 2020-07-21 DIAGNOSIS — D21.9 FIBROIDS: Primary | ICD-10-CM

## 2020-07-21 LAB
ATRIAL RATE: 68
P AXIS: 38
PR INTERVAL: 150
QRS DURATION: 96
QT INTERVAL: 464
QTC CALCULATION(BAZETT): 493
R AXIS: 11
T WAVE AXIS: 47
VENTRICULAR RATE: 68
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
## 2021-02-23 ENCOUNTER — OFFICE VISIT (OUTPATIENT)
Dept: OBSTETRICS AND GYNECOLOGY | Facility: CLINIC | Age: 66
End: 2021-02-23
Payer: COMMERCIAL

## 2021-02-23 VITALS
WEIGHT: 136.5 LBS | SYSTOLIC BLOOD PRESSURE: 122 MMHG | BODY MASS INDEX: 27.52 KG/M2 | HEIGHT: 59 IN | DIASTOLIC BLOOD PRESSURE: 80 MMHG

## 2021-02-23 DIAGNOSIS — Z01.411 ENCOUNTER FOR GYNECOLOGICAL EXAMINATION WITH ABNORMAL FINDING: Primary | ICD-10-CM

## 2021-02-23 DIAGNOSIS — D21.9 FIBROIDS: ICD-10-CM

## 2021-02-23 PROBLEM — M04.1 FAMILIAL MEDITERRANEAN FEVER (CMS/HCC): Status: ACTIVE | Noted: 2019-03-04

## 2021-02-23 PROBLEM — R80.9 PROTEINURIA: Status: ACTIVE | Noted: 2019-03-04

## 2021-02-23 PROBLEM — M81.0 AGE RELATED OSTEOPOROSIS: Status: ACTIVE | Noted: 2021-02-23

## 2021-02-23 PROBLEM — M19.049 LOCALIZED, PRIMARY OSTEOARTHRITIS OF HAND: Status: ACTIVE | Noted: 2021-02-23

## 2021-02-23 PROBLEM — M51.9 UNSPECIFIED THORACIC, THORACOLUMBAR AND LUMBOSACRAL INTERVERTEBRAL DISC DISORDER: Status: ACTIVE | Noted: 2021-02-23

## 2021-02-23 PROBLEM — I10 ESSENTIAL HYPERTENSION: Status: ACTIVE | Noted: 2019-03-04

## 2021-02-23 PROCEDURE — S0612 ANNUAL GYNECOLOGICAL EXAMINA: HCPCS | Performed by: OBSTETRICS & GYNECOLOGY

## 2021-02-23 RX ORDER — CHLORTHALIDONE 25 MG/1
12.5 TABLET ORAL
COMMUNITY
Start: 2021-01-02
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
## 2021-03-01 DIAGNOSIS — D21.9 FIBROIDS: ICD-10-CM

## 2021-04-14 DIAGNOSIS — Z23 ENCOUNTER FOR IMMUNIZATION: ICD-10-CM

## 2021-12-01 DIAGNOSIS — Z12.31 BREAST CANCER SCREENING BY MAMMOGRAM: Primary | ICD-10-CM

## 2021-12-01 NOTE — PROGRESS NOTES
Pt called for a mammo slip to be mailed.  Last seen 2/2021.  Per Dr. Quintanilla mammo ordered in epic and mailed.    MMB

## 2022-03-01 ENCOUNTER — OFFICE VISIT (OUTPATIENT)
Dept: OBSTETRICS AND GYNECOLOGY | Facility: CLINIC | Age: 67
End: 2022-03-01
Payer: COMMERCIAL

## 2022-03-01 VITALS
WEIGHT: 137.6 LBS | SYSTOLIC BLOOD PRESSURE: 120 MMHG | DIASTOLIC BLOOD PRESSURE: 80 MMHG | HEIGHT: 60 IN | BODY MASS INDEX: 27.01 KG/M2

## 2022-03-01 DIAGNOSIS — Z01.419 ENCOUNTER FOR GYNECOLOGICAL EXAMINATION WITHOUT ABNORMAL FINDING: ICD-10-CM

## 2022-03-01 DIAGNOSIS — R92.8 ABNORMALITY OF LEFT BREAST ON SCREENING MAMMOGRAM: Primary | ICD-10-CM

## 2022-03-01 PROCEDURE — S0612 ANNUAL GYNECOLOGICAL EXAMINA: HCPCS | Performed by: OBSTETRICS & GYNECOLOGY

## 2022-03-01 NOTE — PROGRESS NOTES
Patient ID: Luh Graham   : 1955 66 y.o.  MRN: [id number]   Visit Date: 3/1/2022    Subjective   Luh Graham is presenting today for Annual GYN Exam      Patient presents for an annual GYN exam.  She has no GYN complaints at this time.  She had a mammogram done yesterday and was notified that she needs additional films of the left breast.  She has seen Dr. Ellis (breast surgery) in the past.  She will follow up with him if there is a persistent abnormality.  She had a DEXA scan done also.  She sees Dr. Doyle who is treating her with Prolia.  She will follow up with him about this test.          Past Medical History:  has a past medical history of Breast cancer screening (2022), Fibroid, FMF (familial Mediterranean fever) (CMS/HCC), GERD (gastroesophageal reflux disease), H/O bone density study (2020), abnormal cervical Pap smear (), Hypertension, Osteoporosis, and Pap smear for cervical cancer screening (2020).     Past Surgical History:  has a past surgical history that includes Appendectomy;  section; Vulva surgery (); Cervix lesion destruction (); and Colonoscopy ().    Obstetric History:   OB History    Para Term  AB Living   1 1 1     2   SAB IAB Ectopic Multiple Live Births         1 2      # Outcome Date GA Lbr Sudhir/2nd Weight Sex Delivery Anes PTL Lv   1A Term     F CS-LTranv   BENY   1B Term     F CS-LTranv   BENY       Family History: family history includes Breast cancer in her biological sister and niece; Cancer in her biological mother; Diabetes in her biological mother and maternal grandmother; Hypertension in her biological mother; Other cancer in her biological mother.    Social History:  reports that she has never smoked. She has never used smokeless tobacco. She reports current alcohol use. She reports that she does not use drugs.    Medications:   Current Outpatient Medications:   •  canakinumab, PF, (ILARIS, PF,)  150 mg/mL solution, Inject 150 mg under the skin every 28 (twentyeight) days., Disp: , Rfl:   •  chlorthalidone (HYGROTEN) 25 mg tablet, Take 12.5 mg by mouth once daily., Disp: , Rfl:   •  colchicine (COLCRYS) 0.6 mg tablet, Take 1.2 mg by mouth daily.  , Disp: , Rfl:   •  denosumab (PROLIA) 60 mg/mL syringe, Inject 60 mg under the skin., Disp: , Rfl:   •  losartan (COZAAR) 100 mg tablet, Take 100 mg by mouth once daily.  , Disp: , Rfl:       Allergies: is allergic to penicillins, zoledronic acid, and zoledronic acid-mannitol-water.     Review of Systems  Constitutional:   No hot flashes.   No night sweats.   No unexpected weight changes.  HENT:   No oral ulcers.   No headaches related to menstrual cycle.   Breasts:   No changes to skin of the breast or nipple.   No nipple discharge.   No breast lumps/cysts.   No breast pain.   Patient has not noticed any changes to breasts.   Respiratory:   No shortness of breath.  Cardiovascular:   No chest pain  Gastrointestinal:   No changes in bowel habits.  Genitourinary:   No pain with urination.   No urgency with urination.   No increased frequency of urination.   No urinary incontinence.   No vaginal dryness.  No vaginal discharge.   No painful intercourse.   No genital sores.   No irregular menstrual cycles.   No heavy menstrual cycles.   No painful menstrual periods.   No bleeding between menstrual cycles.   No bleeding after intercourse.  No absence of menstrual periods.  Integument:   No changes to any existing genital skin lesions or moles.   No new vaginal skin lesions or moles.   No genital rashes.   Endocrine:   No increased hair growth   Psychiatric:   No anxiety.   No depression.   No suicidal ideation.  Patient does not have concerns for her safety.   Heme-Lymph:   No easy bruising.   No unexplained lumps.           Vital Signs for this encounter:   Visit Vitals  /80   Ht 1.524 m (5')   Wt 62.4 kg (137 lb 9.6 oz)   BMI 26.87 kg/m²       OBGyn  Exam    General Appearance: Alert, cooperative, no acute distress  Head: Normocephalic, without obvious abnormality  Breast: Fibrocystic changes are noted in both upper outer quadrants.  No tenderness, masses, skin changes, adenopathy, or nipple discharge.  Abdomen: Soft, nontender, nondistended, no masses, no organomegaly  Genitalia:   External genitalia: Moderate atrophic changes without rashes or lesions seen.     Vagina: Moderate atrophic changes, admits one finger, no blood or discharge seen.     Cervix: Deep, posterior, no lesions seen.     Uterus: Mid position, normal size.     Adnexa: No palpable masses or tenderness.    Extremities: no edema    Impression/Plan:    Normal GYN exam    Left mammogram ordered.    Patient to follow-up as noted above.    Return here in 1 year or sooner as needed.    NAB

## 2022-10-11 ENCOUNTER — TELEPHONE (OUTPATIENT)
Dept: OBSTETRICS AND GYNECOLOGY | Facility: CLINIC | Age: 67
End: 2022-10-11
Payer: COMMERCIAL

## 2022-10-11 DIAGNOSIS — R92.8 ABNORMALITY OF LEFT BREAST ON SCREENING MAMMOGRAM: Primary | ICD-10-CM

## 2022-10-11 DIAGNOSIS — Z12.31 BREAST CANCER SCREENING BY MAMMOGRAM: ICD-10-CM

## 2022-10-11 NOTE — TELEPHONE ENCOUNTER
Last Mammo: approximate date 3/2022 and was normal  Last OV: 3/1/2022  Next OV: 3/7/2023    Mammo ordered per pt request.  Yumiko Duggan RN

## 2023-03-02 ENCOUNTER — TELEPHONE (OUTPATIENT)
Dept: OBSTETRICS AND GYNECOLOGY | Facility: CLINIC | Age: 68
End: 2023-03-02

## 2023-03-02 DIAGNOSIS — Z12.31 SCREENING MAMMOGRAM FOR BREAST CANCER: Primary | ICD-10-CM

## 2023-03-02 NOTE — PROGRESS NOTES
Pt called for a mammo slip.  Per Dr. Quintanilla mammo ordered in [facility].    MMB

## 2023-07-21 ENCOUNTER — OFFICE VISIT (OUTPATIENT)
Dept: OBSTETRICS AND GYNECOLOGY | Facility: CLINIC | Age: 68
End: 2023-07-21
Payer: MEDICARE

## 2023-07-21 VITALS
HEIGHT: 60 IN | SYSTOLIC BLOOD PRESSURE: 116 MMHG | WEIGHT: 148 LBS | DIASTOLIC BLOOD PRESSURE: 70 MMHG | BODY MASS INDEX: 29.06 KG/M2

## 2023-07-21 DIAGNOSIS — Z01.419 ENCOUNTER FOR GYNECOLOGICAL EXAMINATION WITHOUT ABNORMAL FINDING: Primary | ICD-10-CM

## 2023-07-21 DIAGNOSIS — Z12.31 BREAST CANCER SCREENING BY MAMMOGRAM: ICD-10-CM

## 2023-07-21 PROCEDURE — G0101 CA SCREEN;PELVIC/BREAST EXAM: HCPCS | Mod: GA | Performed by: OBSTETRICS & GYNECOLOGY

## 2023-07-21 RX ORDER — OMEPRAZOLE 10 MG/1
10 CAPSULE, DELAYED RELEASE ORAL
COMMUNITY

## 2023-07-21 NOTE — PROGRESS NOTES
Patient ID: Luh Graham   : 1955 68 y.o.  MRN: [id number]   Visit Date: 2023    Subjective   Luh Graham is presenting today for Annual GYN Exam      Patient presents for an annual GYN exam.  Infrequently, patient notes episodes of stress incontinence.  She does not feel treatment is indicated at this time.  She is being worked up for an unclear liver problem at this time.          Past Medical History:  has a past medical history of Breast cancer screening (2023), Fibroid, FMF (familial Mediterranean fever) (CMS/HCC), GERD (gastroesophageal reflux disease), H/O bone density study (2020), abnormal cervical Pap smear (), Hypertension, Osteoporosis, and Pap smear for cervical cancer screening (2020).     Past Surgical History:  has a past surgical history that includes Appendectomy;  section; Vulva surgery (); Cervix lesion destruction (); Colonoscopy (); and Cataract extraction (Bilateral, ).    Obstetric History:   OB History    Para Term  AB Living   1 1 1     2   SAB IAB Ectopic Multiple Live Births         1 2      # Outcome Date GA Lbr Sudhir/2nd Weight Sex Delivery Anes PTL Lv   1A Term     F CS-LTranv   BENY   1B Term     F CS-LTranv   BENY       Family History: family history includes Breast cancer in her biological sister and niece; Cancer in her biological mother; Diabetes in her biological mother and maternal grandmother; Hypertension in her biological mother; Other cancer in her biological mother.    Social History:   Social History     Tobacco Use   • Smoking status: Never   • Smokeless tobacco: Never   Substance Use Topics   • Alcohol use: Not Currently     Comment: rare   • Drug use: No       Medications:   Current Outpatient Medications:   •  canakinumab, PF, (ILARIS, PF,) 150 mg/mL solution, Inject 150 mg under the skin every 28 (twentyeight) days., Disp: , Rfl:   •  chlorthalidone (HYGROTEN) 25 mg tablet, Take 12.5  "mg by mouth once daily. Every other day, Disp: , Rfl:   •  colchicine (COLCRYS) 0.6 mg tablet, Take 1.2 mg by mouth daily.  , Disp: , Rfl:   •  denosumab (PROLIA) 60 mg/mL syringe, Inject 60 mg under the skin. Every 6 month, Disp: , Rfl:   •  losartan (COZAAR) 100 mg tablet, Take 100 mg by mouth once daily.  , Disp: , Rfl:   •  omeprazole (PriLOSEC) 20 mg capsule, Take 20 mg by mouth daily before breakfast., Disp: , Rfl:       Allergies: is allergic to penicillins, zoledronic acid, and zoledronic acid-mannitol-water.     Review of Systems  Constitutional:   No hot flashes.   No night sweats.   No unexpected weight changes.  HENT:   No oral ulcers.   No headaches related to menstrual cycle.   Breasts:   No changes to skin of the breast or nipple.   No nipple discharge.   No breast lumps/cysts.   No breast pain.   Patient has not noticed any changes to breasts.   Respiratory:   No shortness of breath.  Cardiovascular:   No chest pain  Gastrointestinal:   No changes in bowel habits.  Genitourinary:   No pain with urination.   No urgency with urination.   No increased frequency of urination.   + urinary incontinence.   No vaginal dryness.  No vaginal discharge.   No painful intercourse.   No genital sores.   No irregular menstrual cycles.   No heavy menstrual cycles.   No painful menstrual periods.   No bleeding between menstrual cycles.   No bleeding after intercourse.  No absence of menstrual periods.  Integument:   No changes to any existing genital skin lesions or moles.   No new vaginal skin lesions or moles.   No genital rashes.   Endocrine:   No increased hair growth   Psychiatric:   No anxiety.   No depression.   No suicidal ideation.  Patient does not have concerns for her safety.   Heme-Lymph:   No easy bruising.   No unexplained lumps.           Vital Signs for this encounter:   Visit Vitals  /70 (BP Location: Right upper arm, Patient Position: Sitting)   Ht 1.511 m (4' 11.5\")   Wt 67.1 kg (148 lb) "   BMI 29.39 kg/m²       OBGyn Exam    General Appearance: Alert, cooperative, no acute distress  Head: Normocephalic, without obvious abnormality  Breast: No tenderness, masses, skin changes, adenopathy, or nipple discharge.  Abdomen: Soft, nontender, nondistended, no masses, no organomegaly  Genitalia:   External genitalia: Moderate atrophic changes without rashes or lesions seen.     Vagina: Moderate atrophic changes, admits one finger, no blood or discharge seen.     Cervix: Deep, posterior, no lesions seen.     Uterus: Mid position, normal size.     Adnexa: No palpable masses or tenderness.    Extremities: no edema    Impression/Plan:    Normal GYN exam    Mammogram ordered.    Return 1 year or sooner as needed.    NAB

## 2024-01-24 ENCOUNTER — TELEPHONE (OUTPATIENT)
Dept: OBSTETRICS AND GYNECOLOGY | Facility: CLINIC | Age: 69
End: 2024-01-24
Payer: COMMERCIAL

## 2024-01-24 NOTE — TELEPHONE ENCOUNTER
Called pt returning her call.  Pt had a RSV shot last week(Thursday).  She is having discomfort under axilla of the same arm the RSV shot was given.  Told pt to give it a week an call me with an update.  The RSV shot is new so not enough time to say it has the same effect that the covid shot did.  Pt will call next week.    LENNY

## 2024-08-01 ENCOUNTER — OFFICE VISIT (OUTPATIENT)
Dept: OBSTETRICS AND GYNECOLOGY | Facility: CLINIC | Age: 69
End: 2024-08-01
Payer: MEDICARE

## 2024-08-01 VITALS
BODY MASS INDEX: 30.04 KG/M2 | WEIGHT: 153 LBS | SYSTOLIC BLOOD PRESSURE: 120 MMHG | DIASTOLIC BLOOD PRESSURE: 72 MMHG | HEIGHT: 60 IN

## 2024-08-01 DIAGNOSIS — Z01.419 ENCOUNTER FOR GYNECOLOGICAL EXAMINATION WITHOUT ABNORMAL FINDING: Primary | ICD-10-CM

## 2024-08-01 PROCEDURE — G0101 CA SCREEN;PELVIC/BREAST EXAM: HCPCS | Mod: GA | Performed by: OBSTETRICS & GYNECOLOGY

## 2024-08-01 RX ORDER — CARBIDOPA AND LEVODOPA 25; 100 MG/1; MG/1
1 TABLET ORAL 3 TIMES DAILY
COMMUNITY

## 2024-08-01 NOTE — PROGRESS NOTES
Patient ID: Luh Graham   : 1955 69 y.o.  MRN: [id number]   Visit Date: 2024    Subjective   Luh Graham is presenting today for Annual GYN Exam      Patient presents for an annual GYN exam.  She has no complaints or concerns at this time.  She is being evaluated for possible Parkinson's disease.            Past Medical History:  has a past medical history of Breast cancer screening (2024), Fibroid, FMF (familial Mediterranean fever) (CMS/[facility]), GERD (gastroesophageal reflux disease), H/O bone density study (2020), abnormal cervical Pap smear (), Hypertension, Osteoporosis, and Pap smear for cervical cancer screening (2020).     Past Surgical History:  has a past surgical history that includes Appendectomy;  section; Vulva surgery (); Cervix lesion destruction (); Colonoscopy (); and Cataract extraction (Bilateral, ).    Obstetric History:   OB History    Para Term  AB Living   1 1 1     2   SAB IAB Ectopic Multiple Live Births         1 2      # Outcome Date GA Lbr Sudhir/2nd Weight Sex Delivery Anes PTL Lv   1A Term     F CS-LTranv   BENY   1B Term     F CS-LTranv   BENY       Family History: family history includes Breast cancer in her biological sister and niece; Cancer in her biological mother; Diabetes in her biological mother and maternal grandmother; Hypertension in her biological mother; Other cancer in her biological mother.    Social History:   Social History     Tobacco Use    Smoking status: Never    Smokeless tobacco: Never   Substance Use Topics    Alcohol use: Not Currently     Comment: rare    Drug use: No       Medications:   Current Outpatient Medications:     canakinumab, PF, (ILARIS, PF,) 150 mg/mL solution, Inject 150 mg under the skin every 28 (twentyeight) days., Disp: , Rfl:     chlorthalidone (HYGROTEN) 25 mg tablet, Take 12.5 mg by mouth once daily. Every other day, Disp: , Rfl:     colchicine (COLCRYS) 0.6  "mg tablet, Take 1.2 mg by mouth daily.  , Disp: , Rfl:     denosumab (PROLIA) 60 mg/mL syringe, Inject 60 mg under the skin. Every 6 month, Disp: , Rfl:     losartan (COZAAR) 100 mg tablet, Take 100 mg by mouth once daily.  , Disp: , Rfl:     omeprazole (PriLOSEC) 10 mg capsule, Take 10 mg by mouth daily before breakfast., Disp: , Rfl:     carbidopa-levodopa (SINEMET)  mg per tablet, Take 1 tablet by mouth 3 (three) times a day., Disp: , Rfl:       Allergies: is allergic to penicillins, zoledronic acid, and zoledronic acid-mannitol-water.     Review of Systems  Constitutional:   No hot flashes.   No night sweats.   No unexpected weight changes.  HENT:   No oral ulcers.   No headaches related to menstrual cycle.   Breasts:   No changes to skin of the breast or nipple.   No nipple discharge.   No breast lumps/cysts.   No breast pain.   Patient has not noticed any changes to breasts.   Respiratory:   No shortness of breath.  Cardiovascular:   No chest pain  Gastrointestinal:   No changes in bowel habits.  Genitourinary:   No pain with urination.   No urgency with urination.   No increased frequency of urination.   No urinary incontinence.   No vaginal dryness.  No vaginal discharge.   No painful intercourse.   No genital sores.   No irregular menstrual cycles.   No heavy menstrual cycles.   No painful menstrual periods.   No bleeding between menstrual cycles.   No bleeding after intercourse.  No absence of menstrual periods.  Integument:   No changes to any existing genital skin lesions or moles.   No new vaginal skin lesions or moles.   No genital rashes.   Endocrine:   No increased hair growth   Psychiatric:   No anxiety.   No depression.   No suicidal ideation.  Patient does not have concerns for her safety.   Heme-Lymph:   No easy bruising.   No unexplained lumps.           Vital Signs for this encounter: Visit Vitals  /72 (BP Location: Right upper arm, Patient Position: Sitting)   Ht 1.511 m (4' 11.5\") "   Wt 69.4 kg (153 lb)   BMI 30.39 kg/m²       OBGyn Exam    General Appearance: Alert, cooperative, no acute distress  Head: Normocephalic, without obvious abnormality  Breast: No tenderness, masses, skin changes, adenopathy, or nipple discharge.  Abdomen: Soft, nontender, nondistended, no masses, no organomegaly  Genitalia:   External genitalia: Moderate atrophic changes without rashes or lesions seen.     Vagina: Moderate atrophic changes, admits one finger, no blood or discharge seen.     Cervix: Deep, posterior, no lesions seen.     Uterus: Mid position, normal size.     Adnexa: No palpable masses or tenderness.       Extremities: no edema    Impression/Plan:    Normal GYN exam    NAB